# Patient Record
Sex: MALE | Race: WHITE
[De-identification: names, ages, dates, MRNs, and addresses within clinical notes are randomized per-mention and may not be internally consistent; named-entity substitution may affect disease eponyms.]

---

## 2019-04-29 ENCOUNTER — HOSPITAL ENCOUNTER (EMERGENCY)
Dept: HOSPITAL 31 - C.ER | Age: 59
Discharge: HOME | End: 2019-04-29
Payer: SELF-PAY

## 2019-04-29 VITALS — OXYGEN SATURATION: 100 % | TEMPERATURE: 98.8 F

## 2019-04-29 VITALS — HEART RATE: 78 BPM | RESPIRATION RATE: 18 BRPM | SYSTOLIC BLOOD PRESSURE: 129 MMHG | DIASTOLIC BLOOD PRESSURE: 87 MMHG

## 2019-04-29 DIAGNOSIS — K11.20: Primary | ICD-10-CM

## 2019-04-29 DIAGNOSIS — I10: ICD-10-CM

## 2019-04-29 NOTE — C.PDOC
History Of Present Illness


59 year old male presents to the ED c/o right sided neck swelling for the past 

1-2 days. Patient states recently traveling to [place] last week, states "I think 

I have Mumps". Patient denies fever, chills, headache, nausea, vomit, sore 

throat, rash. 


Time Seen by Provider: 04/29/19 22:38


Chief Complaint (Nursing): ENT Problem


History Per: Patient


History/Exam Limitations: None


Onset/Duration Of Symptoms: Days (1-2)


Current Symptoms Are (Timing): Still Present


Quality (Mouth/Throat): Swelling


Anticoagulant/Antiplatlet Use?: No


Recent Aspirin Use: No





Past Medical History


Reviewed: Historical Data, Nursing Documentation, Vital Signs


Vital Signs: 





                                Last Vital Signs











Temp  98.8 F   04/29/19 22:20


 


Pulse  68   04/29/19 22:20


 


Resp  16   04/29/19 22:20


 


BP  174/109 H  04/29/19 22:20


 


Pulse Ox  100   04/29/19 22:20














- Medical History


PMH: HTN


Surgical History: No Surg Hx


Family History: States: Unknown Family Hx





- Social History


Hx Alcohol Use: No


Hx Substance Use: No





- Immunization History


Hx Tetanus Toxoid Vaccination: No





Review Of Systems


Constitutional: Negative for: Fever, Chills


ENT: Positive for: Throat Swelling.  Negative for: Ear Pain, Nose Congestion, 

Mouth Swelling


Respiratory: Negative for: Cough, Shortness of Breath


Gastrointestinal: Negative for: Vomiting


Skin: Negative for: Rash


Neurological: Negative for: Headache





Physical Exam





- Physical Exam


Appears: Non-toxic, No Acute Distress


Skin: Normal Color, Warm, Dry, No Rash


Head: Atraumatic, Normacephalic


Eye(s): bilateral: Normal Inspection, PERRL, EOMI


Ear(s): Bilateral: Normal


Oral Mucosa: Moist


Tongue: Normal Appearing, No Swelling


Lips: Normal Appearing, No Swelling


Throat: Normal, No Erythema, No Exudate, Other (uvula midline, airway patent)


Neck: Normal ROM, Supple


Lymphatic: Adenopathy (2 cm right sided submandibular lymphnode vs parotid 

swelling)


Chest: Symmetrical


Cardiovascular: Rhythm Regular


Respiratory: Normal Breath Sounds, No Rales, No Rhonchi, No Wheezing


Gastrointestinal/Abdominal: Soft, No Tenderness


Back: Normal Inspection, No CVA Tenderness


Extremity: Normal ROM, No Swelling


Neurological/Psych: Oriented x3, Normal Speech, Normal Cognition, Normal Motor


Gait: Steady





ED Course And Treatment


O2 Sat by Pulse Oximetry: 100 (ON RA)


Pulse Ox Interpretation: Normal





Medical Decision Making


Medical Decision Making: 


Plan:


* augmentin 1 tab PO





Patient reports that he had mumps as a children and that this is less likely 

mumps. Patient was offered lab work and CT scan but the patient declines at this

time. 


Patient was prescribed antibiotics to take home. Patient advised to follow up 

with PMD/ENT, return precautions were discussed. 





Disposition





- Disposition


Referrals: 


Behin,Babak, MD [Staff Provider] - 


Disposition: HOME/ ROUTINE


Disposition Time: 23:23


Condition: GOOD


Additional Instructions: 


Follow up with the medical doctor within 1-2 days, Return if worsened. 


Prescriptions: 


Amoxicillin/Clavulanate [Augmentin 875 MG-125 MG] 1 tab PO BID #14 tab


predniSONE [Prednisone] 10 mg PO BID #10 tab


Instructions:  Parotitis


Forms:  [employer] (English)





- Clinical Impression


Clinical Impression: 


 Parotiditis








- PA / NP / Resident Statement


MD/DO has reviewed & agrees with the documentation as recorded.





- Scribe Statement


The provider has reviewed the documentation as recorded by the Scribe


Gagandeep Diaz





All medical record entries made by the Blakeibmignon were at my direction and 

personally dictated by me. I have reviewed the chart and agree that the record 

accurately reflects my personal performance of the history, physical exam, 

medical decision making, and the department course for this patient. I have also

 personally directed, reviewed, and agree with the discharge instructions and 

disposition.